# Patient Record
Sex: MALE | Race: WHITE | Employment: OTHER | URBAN - METROPOLITAN AREA
[De-identification: names, ages, dates, MRNs, and addresses within clinical notes are randomized per-mention and may not be internally consistent; named-entity substitution may affect disease eponyms.]

---

## 2017-03-21 ENCOUNTER — ANESTHESIA EVENT (OUTPATIENT)
Dept: SURGERY | Facility: CLINIC | Age: 57
End: 2017-03-21
Payer: COMMERCIAL

## 2017-03-22 ENCOUNTER — HOSPITAL ENCOUNTER (OUTPATIENT)
Facility: CLINIC | Age: 57
Discharge: HOME | End: 2017-03-22
Attending: OPHTHALMOLOGY | Admitting: OPHTHALMOLOGY
Payer: COMMERCIAL

## 2017-03-22 ENCOUNTER — ANESTHESIA (OUTPATIENT)
Dept: SURGERY | Facility: CLINIC | Age: 57
End: 2017-03-22
Payer: COMMERCIAL

## 2017-03-22 ENCOUNTER — SURGERY (OUTPATIENT)
Age: 57
End: 2017-03-22

## 2017-03-22 VITALS
HEIGHT: 66 IN | SYSTOLIC BLOOD PRESSURE: 156 MMHG | DIASTOLIC BLOOD PRESSURE: 98 MMHG | OXYGEN SATURATION: 93 % | BODY MASS INDEX: 34.23 KG/M2 | TEMPERATURE: 98.4 F | RESPIRATION RATE: 15 BRPM | WEIGHT: 213 LBS

## 2017-03-22 DIAGNOSIS — H04.552 NASOLACRIMAL DUCT OBSTRUCTION, ACQUIRED, LEFT: Primary | ICD-10-CM

## 2017-03-22 PROCEDURE — 25000125 ZZHC RX 250: Performed by: ANESTHESIOLOGY

## 2017-03-22 PROCEDURE — 27210794 ZZH OR GENERAL SUPPLY STERILE: Performed by: OPHTHALMOLOGY

## 2017-03-22 PROCEDURE — 36000101 ZZH EYE SURGERY LEVEL 3 1ST 30 MIN: Performed by: OPHTHALMOLOGY

## 2017-03-22 PROCEDURE — 25800025 ZZH RX 258: Performed by: ANESTHESIOLOGY

## 2017-03-22 PROCEDURE — 25000128 H RX IP 250 OP 636: Performed by: NURSE ANESTHETIST, CERTIFIED REGISTERED

## 2017-03-22 PROCEDURE — 37000008 ZZH ANESTHESIA TECHNICAL FEE, 1ST 30 MIN: Performed by: OPHTHALMOLOGY

## 2017-03-22 PROCEDURE — 25000125 ZZHC RX 250: Performed by: NURSE ANESTHETIST, CERTIFIED REGISTERED

## 2017-03-22 PROCEDURE — 71000004 ZZH RECOVERY EYE PHASE 1 LEVEL 1 FIRST HR: Performed by: OPHTHALMOLOGY

## 2017-03-22 PROCEDURE — 40000170 ZZH STATISTIC PRE-PROCEDURE ASSESSMENT II: Performed by: OPHTHALMOLOGY

## 2017-03-22 PROCEDURE — 37000009 ZZH ANESTHESIA TECHNICAL FEE, EACH ADDTL 15 MIN: Performed by: OPHTHALMOLOGY

## 2017-03-22 PROCEDURE — 25000125 ZZHC RX 250: Performed by: OPHTHALMOLOGY

## 2017-03-22 PROCEDURE — 27810169 ZZH OR IMPLANT GENERAL: Performed by: OPHTHALMOLOGY

## 2017-03-22 PROCEDURE — 25000566 ZZH SEVOFLURANE, EA 15 MIN: Performed by: OPHTHALMOLOGY

## 2017-03-22 PROCEDURE — 25000132 ZZH RX MED GY IP 250 OP 250 PS 637: Performed by: OPHTHALMOLOGY

## 2017-03-22 PROCEDURE — 71000028 ZZH EYE RECOVERY PHASE 2 EACH 15 MINS: Performed by: OPHTHALMOLOGY

## 2017-03-22 PROCEDURE — 36000102 ZZH EYE SURGERY LEVEL 3 EA 15 ADDTL MIN: Performed by: OPHTHALMOLOGY

## 2017-03-22 DEVICE — EYE STENT LACRIMAL LRG LIS052: Type: IMPLANTABLE DEVICE | Site: LACRIMAL DUCT | Status: FUNCTIONAL

## 2017-03-22 RX ORDER — FENTANYL CITRATE 50 UG/ML
50-100 INJECTION, SOLUTION INTRAMUSCULAR; INTRAVENOUS
Status: DISCONTINUED | OUTPATIENT
Start: 2017-03-22 | End: 2017-03-22 | Stop reason: HOSPADM

## 2017-03-22 RX ORDER — OXYCODONE AND ACETAMINOPHEN 5; 325 MG/1; MG/1
1-2 TABLET ORAL EVERY 6 HOURS PRN
Qty: 12 TABLET | Refills: 0 | Status: SHIPPED | OUTPATIENT
Start: 2017-03-22 | End: 2020-07-09

## 2017-03-22 RX ORDER — TOBRAMYCIN AND DEXAMETHASONE 3; 1 MG/ML; MG/ML
1 SUSPENSION/ DROPS OPHTHALMIC 4 TIMES DAILY
Qty: 2 ML | Refills: 1 | Status: SHIPPED | OUTPATIENT
Start: 2017-03-22 | End: 2017-04-01

## 2017-03-22 RX ORDER — SODIUM CHLORIDE, SODIUM LACTATE, POTASSIUM CHLORIDE, CALCIUM CHLORIDE 600; 310; 30; 20 MG/100ML; MG/100ML; MG/100ML; MG/100ML
INJECTION, SOLUTION INTRAVENOUS CONTINUOUS
Status: DISCONTINUED | OUTPATIENT
Start: 2017-03-22 | End: 2017-03-22 | Stop reason: HOSPADM

## 2017-03-22 RX ORDER — OXYMETAZOLINE HYDROCHLORIDE 0.05 G/100ML
2 SPRAY NASAL
Status: COMPLETED | OUTPATIENT
Start: 2017-03-22 | End: 2017-03-22

## 2017-03-22 RX ORDER — ONDANSETRON 2 MG/ML
4 INJECTION INTRAMUSCULAR; INTRAVENOUS EVERY 30 MIN PRN
Status: DISCONTINUED | OUTPATIENT
Start: 2017-03-22 | End: 2017-03-22 | Stop reason: HOSPADM

## 2017-03-22 RX ORDER — NEOMYCIN SULFATE, POLYMYXIN B SULFATE, AND DEXAMETHASONE 3.5; 10000; 1 MG/G; [USP'U]/G; MG/G
OINTMENT OPHTHALMIC PRN
Status: DISCONTINUED | OUTPATIENT
Start: 2017-03-22 | End: 2017-03-22 | Stop reason: HOSPADM

## 2017-03-22 RX ORDER — MEPERIDINE HYDROCHLORIDE 25 MG/ML
12.5 INJECTION INTRAMUSCULAR; INTRAVENOUS; SUBCUTANEOUS
Status: DISCONTINUED | OUTPATIENT
Start: 2017-03-22 | End: 2017-03-22 | Stop reason: HOSPADM

## 2017-03-22 RX ORDER — NALOXONE HYDROCHLORIDE 0.4 MG/ML
.1-.4 INJECTION, SOLUTION INTRAMUSCULAR; INTRAVENOUS; SUBCUTANEOUS
Status: DISCONTINUED | OUTPATIENT
Start: 2017-03-22 | End: 2017-03-22 | Stop reason: HOSPADM

## 2017-03-22 RX ORDER — ONDANSETRON 4 MG/1
4 TABLET, ORALLY DISINTEGRATING ORAL EVERY 30 MIN PRN
Status: DISCONTINUED | OUTPATIENT
Start: 2017-03-22 | End: 2017-03-22 | Stop reason: HOSPADM

## 2017-03-22 RX ORDER — FENTANYL CITRATE 50 UG/ML
25-50 INJECTION, SOLUTION INTRAMUSCULAR; INTRAVENOUS
Status: DISCONTINUED | OUTPATIENT
Start: 2017-03-22 | End: 2017-03-22 | Stop reason: HOSPADM

## 2017-03-22 RX ORDER — DEXAMETHASONE SODIUM PHOSPHATE 4 MG/ML
INJECTION, SOLUTION INTRA-ARTICULAR; INTRALESIONAL; INTRAMUSCULAR; INTRAVENOUS; SOFT TISSUE PRN
Status: DISCONTINUED | OUTPATIENT
Start: 2017-03-22 | End: 2017-03-22

## 2017-03-22 RX ORDER — FENTANYL CITRATE 50 UG/ML
INJECTION, SOLUTION INTRAMUSCULAR; INTRAVENOUS PRN
Status: DISCONTINUED | OUTPATIENT
Start: 2017-03-22 | End: 2017-03-22

## 2017-03-22 RX ORDER — PROPOFOL 10 MG/ML
INJECTION, EMULSION INTRAVENOUS CONTINUOUS PRN
Status: DISCONTINUED | OUTPATIENT
Start: 2017-03-22 | End: 2017-03-22

## 2017-03-22 RX ORDER — ONDANSETRON 2 MG/ML
INJECTION INTRAMUSCULAR; INTRAVENOUS PRN
Status: DISCONTINUED | OUTPATIENT
Start: 2017-03-22 | End: 2017-03-22

## 2017-03-22 RX ORDER — CEFAZOLIN SODIUM 1 G/3ML
INJECTION, POWDER, FOR SOLUTION INTRAMUSCULAR; INTRAVENOUS PRN
Status: DISCONTINUED | OUTPATIENT
Start: 2017-03-22 | End: 2017-03-22

## 2017-03-22 RX ORDER — LIDOCAINE HYDROCHLORIDE 20 MG/ML
INJECTION, SOLUTION INFILTRATION; PERINEURAL PRN
Status: DISCONTINUED | OUTPATIENT
Start: 2017-03-22 | End: 2017-03-22

## 2017-03-22 RX ORDER — FENTANYL CITRATE 50 UG/ML
25-50 INJECTION, SOLUTION INTRAMUSCULAR; INTRAVENOUS EVERY 5 MIN PRN
Status: DISCONTINUED | OUTPATIENT
Start: 2017-03-22 | End: 2017-03-22 | Stop reason: HOSPADM

## 2017-03-22 RX ORDER — CEPHALEXIN 500 MG/1
500 CAPSULE ORAL 3 TIMES DAILY
Qty: 30 CAPSULE | Refills: 0 | Status: SHIPPED | OUTPATIENT
Start: 2017-03-22 | End: 2020-07-09

## 2017-03-22 RX ORDER — LIDOCAINE HYDROCHLORIDE AND EPINEPHRINE 10; 10 MG/ML; UG/ML
INJECTION, SOLUTION INFILTRATION; PERINEURAL PRN
Status: DISCONTINUED | OUTPATIENT
Start: 2017-03-22 | End: 2017-03-22 | Stop reason: HOSPADM

## 2017-03-22 RX ORDER — PROPOFOL 10 MG/ML
INJECTION, EMULSION INTRAVENOUS PRN
Status: DISCONTINUED | OUTPATIENT
Start: 2017-03-22 | End: 2017-03-22

## 2017-03-22 RX ADMIN — MIDAZOLAM HYDROCHLORIDE 2 MG: 1 INJECTION, SOLUTION INTRAMUSCULAR; INTRAVENOUS at 11:35

## 2017-03-22 RX ADMIN — LIDOCAINE HYDROCHLORIDE,EPINEPHRINE BITARTRATE 4 ML: 10; .01 INJECTION, SOLUTION INFILTRATION; PERINEURAL at 12:00

## 2017-03-22 RX ADMIN — PROPOFOL 200 MCG/KG/MIN: 10 INJECTION, EMULSION INTRAVENOUS at 11:43

## 2017-03-22 RX ADMIN — LIDOCAINE HYDROCHLORIDE 1 ML: 10 INJECTION, SOLUTION EPIDURAL; INFILTRATION; INTRACAUDAL; PERINEURAL at 09:09

## 2017-03-22 RX ADMIN — ONDANSETRON 4 MG: 2 INJECTION INTRAMUSCULAR; INTRAVENOUS at 12:55

## 2017-03-22 RX ADMIN — PROPOFOL 50 MG: 10 INJECTION, EMULSION INTRAVENOUS at 11:47

## 2017-03-22 RX ADMIN — OXYMETAZOLINE HYDROCHLORIDE 2 SPRAY: 5 SPRAY NASAL at 11:17

## 2017-03-22 RX ADMIN — LIDOCAINE HYDROCHLORIDE 100 MG: 20 INJECTION, SOLUTION INFILTRATION; PERINEURAL at 11:40

## 2017-03-22 RX ADMIN — FENTANYL CITRATE 50 MCG: 50 INJECTION, SOLUTION INTRAMUSCULAR; INTRAVENOUS at 11:39

## 2017-03-22 RX ADMIN — FENTANYL CITRATE 50 MCG: 50 INJECTION, SOLUTION INTRAMUSCULAR; INTRAVENOUS at 11:51

## 2017-03-22 RX ADMIN — ONDANSETRON 4 MG: 2 INJECTION INTRAMUSCULAR; INTRAVENOUS at 12:24

## 2017-03-22 RX ADMIN — OXYMETAZOLINE HYDROCHLORIDE 2 SPRAY: 5 SPRAY NASAL at 11:08

## 2017-03-22 RX ADMIN — DEXMEDETOMIDINE 0.5 MCG/KG/HR: 100 INJECTION, SOLUTION, CONCENTRATE INTRAVENOUS at 11:43

## 2017-03-22 RX ADMIN — CEFAZOLIN 2 G: 1 INJECTION, POWDER, FOR SOLUTION INTRAMUSCULAR; INTRAVENOUS at 11:57

## 2017-03-22 RX ADMIN — SODIUM CHLORIDE, POTASSIUM CHLORIDE, SODIUM LACTATE AND CALCIUM CHLORIDE: 600; 310; 30; 20 INJECTION, SOLUTION INTRAVENOUS at 11:16

## 2017-03-22 RX ADMIN — PROPOFOL 200 MG: 10 INJECTION, EMULSION INTRAVENOUS at 11:40

## 2017-03-22 RX ADMIN — PROPOFOL 50 MG: 10 INJECTION, EMULSION INTRAVENOUS at 12:12

## 2017-03-22 RX ADMIN — DEXAMETHASONE SODIUM PHOSPHATE 8 MG: 4 INJECTION, SOLUTION INTRAMUSCULAR; INTRAVENOUS at 11:47

## 2017-03-22 RX ADMIN — NEOMYCIN SULFATE, POLYMYXIN B SULFATE, AND DEXAMETHASONE 1 G: 3.5; 10000; 1 OINTMENT OPHTHALMIC at 12:48

## 2017-03-22 ASSESSMENT — COPD QUESTIONNAIRES: COPD: 0

## 2017-03-22 ASSESSMENT — LIFESTYLE VARIABLES: TOBACCO_USE: 0

## 2017-03-22 ASSESSMENT — ENCOUNTER SYMPTOMS
ORTHOPNEA: 0
SEIZURES: 0
DYSRHYTHMIAS: 0

## 2017-03-22 NOTE — DISCHARGE INSTRUCTIONS
[facility] Discharge  Instructions  Anesthesia ([facility])   Adult Discharge Instructions (General)    For 24 hours after surgery    1. Get plenty of rest.  Make arrangements to have a responsible adult stay with you for at least 24 hours.  2. Do not drive or use heavy equipment for 24 hours.    3. Do not drink alcohol for 24 hours.  4. Do not sign legal documents or make important decisions for 24 hours.  5. Avoid strenuous or risky activities. You may feel lightheaded.  If so, sit for a few minutes before standing.  Have someone help you get up.   6. General anesthesia patients should drink only clear liquids (apple juice, ginger ale, broth or 7-Up). Be sure to drink enough fluids.  Move to a regular diet as you feel able.  7. Any questions of medical nature, call your physician.    Dacryocystorhinostomy Discharge Instructions   [place] [facility]  LIZETH JONES MD  [facility]  [address]  [place], [place] [address] [phone]  Things to avoid:    You should avoid blowing the nose forcefully or heavy sneezing while any tubing is in place to keep the tubes from coming loose or displacing.  The inside of the nose should not be disturbed if at all possible.  If you have any drying or discomfort following your DRC on the inside of the nose, it is okay to apply a small amount of Vaseline gently to the area.  Additionally, saline nasal spray may be helpful. If you feel any stuffiness or congestion while any tubing is in place, we recommend nasal strips that can be bought at any drugstore and worn at night.    You will see a tiny end of the silicone tubing extruding from the corner of your operated eye(s). Be careful not to disturb this area. If the silicone tubing does dislodge and extend into the eye, tape the tube using medical adhesive, to the side of the nose.  Call our office to set up an appointment to have the  tube repositioned or removed.  The rubber tube inside your nose should fall out on its own in about 4-6 weeks. If it does not, call the office and the doctor can remove it in the office.  If the tube loosens or falls out sooner, it is okay because this tube was in place only to add extra bulk while the natural passage is forming and won t severely affect the overall outcome of the surgery.  Activity:  Please avoid heavy lifting or strenuous exercise for seven days after surgery. You may resume regular activities as tolerated. Air travel should also be avoided during this period of time. You may carefully shower on the second day after surgery.  At night, sleep with your head elevated on 2-3 pillows.  Using ice packs will help reduce bruising and swelling. Continue this until the swelling subsides.    Medication:  If the doctor has given you some medications to take after surgery, please take these according to the instructions on the bottle. Pain medications may make you drowsy so do not drive, operate heavy machinery, or use alcohol while taking pain medications.  Bleeding:  After a DCR, it is normal to have some bloody discharge from the nose that may empty into the back of the throat. This will subside over the first one to two days after surgery. Please contact the doctor if there is excessive bleeding which does not respond to simple pressure on the nose.  Questions:  Please feel free to contact the office should any questions come up which are not answered above. The phone number is [phone].    Reasons to contact your surgeon:    1. Signs of possible infection: Check your incision daily for redness, swelling, warmth, red streaks or foul drainage.   2. Elevated temperature.  3. Pain not controlled with pain medication and/or rest.   4. Uncontrolled nausea or vomiting.  5. Any questions or concerns.

## 2017-03-22 NOTE — IP AVS SNAPSHOT
[facility]    [address]    [place] [address]    Phone:  [phone]    Fax:  [phone]                                       After Visit Summary   3/22/2017    Javier Hopson    MRN: [id number]           After Visit Summary Signature Page     I have received my discharge instructions, and my questions have been answered. I have discussed any challenges I see with this plan with the nurse or doctor.    ..........................................................................................................................................  Patient/Patient Representative Signature      ..........................................................................................................................................  Patient Representative Print Name and Relationship to Patient    ..................................................               ................................................  Date                                            Time    ..........................................................................................................................................  Reviewed by Signature/Title    ...................................................              ..............................................  Date                                                            Time

## 2017-03-22 NOTE — PROGRESS NOTES
LMA removed at bedside with MADHAVI Wagoner. Pt suctioned, appears well oxygenated on 6L Simple Facemask. RN to wean O2.

## 2017-03-22 NOTE — IP AVS SNAPSHOT
MRN:[id number]                      After Visit Summary   3/22/2017    Javier Hopson    MRN: [id number]           Thank you!     Thank you for choosing [facility] for your care. Our goal is always to provide you with excellent care. Hearing back from our patients is one way we can continue to improve our services. Please take a few minutes to complete the written survey that you may receive in the mail after you visit with us. Thank you!        Patient Information     Date Of Birth          1960        About your hospital stay     You were admitted on:  March 22, 2017 You last received care in the:  [facility]    You were discharged on:  March 22, 2017       Who to Call     For medical emergencies, please call 911.  For non-urgent questions about your medical care, please call your primary care provider or clinic, [phone]  For questions related to your surgery, please call your surgery clinic        Attending Provider     Provider Specialty    Maurisio Rueda MD Ophthalmology       Primary Care Provider Office Phone # Fax #    [facility] [phone]       [address] [place] [address] [address]  [facility] [id number]        After Care Instructions     Eye drops as prescribed by physician.  Start drops today unless told otherwise by the physician           Notify Physician if you have severe headache or nausea           Remove patch per physician instruction           Return to clinic as instructed by physician           Wear eye shield or patch as directed                 Further instructions from your care team       [facility] Discharge  Instructions  Anesthesia ([facility])   Adult Discharge Instructions (General)    For 24 hours after surgery    1. Get plenty of rest.  Make arrangements to have a responsible adult stay with you for at least 24 hours.  2. Do not drive or use heavy  equipment for 24 hours.    3. Do not drink alcohol for 24 hours.  4. Do not sign legal documents or make important decisions for 24 hours.  5. Avoid strenuous or risky activities. You may feel lightheaded.  If so, sit for a few minutes before standing.  Have someone help you get up.   6. General anesthesia patients should drink only clear liquids (apple juice, ginger ale, broth or 7-Up). Be sure to drink enough fluids.  Move to a regular diet as you feel able.  7. Any questions of medical nature, call your physician.    Dacryocystorhinostomy Discharge Instructions   [place] [facility]  LIZETH JONES MD  [facility]  [address]  [place], [place] [address] [phone]  Things to avoid:    You should avoid blowing the nose forcefully or heavy sneezing while any tubing is in place to keep the tubes from coming loose or displacing.  The inside of the nose should not be disturbed if at all possible.  If you have any drying or discomfort following your DRC on the inside of the nose, it is okay to apply a small amount of Vaseline gently to the area.  Additionally, saline nasal spray may be helpful. If you feel any stuffiness or congestion while any tubing is in place, we recommend nasal strips that can be bought at any drugstore and worn at night.    You will see a tiny end of the silicone tubing extruding from the corner of your operated eye(s). Be careful not to disturb this area. If the silicone tubing does dislodge and extend into the eye, tape the tube using medical adhesive, to the side of the nose.  Call our office to set up an appointment to have the tube repositioned or removed.  The rubber tube inside your nose should fall out on its own in about 4-6 weeks. If it does not, call the office and the doctor can remove it in the office.  If the tube loosens or falls out sooner, it is okay because this tube was in place only to add extra bulk while the  "natural passage is forming and won t severely affect the overall outcome of the surgery.  Activity:  Please avoid heavy lifting or strenuous exercise for seven days after surgery. You may resume regular activities as tolerated. Air travel should also be avoided during this period of time. You may carefully shower on the second day after surgery.  At night, sleep with your head elevated on 2-3 pillows.  Using ice packs will help reduce bruising and swelling. Continue this until the swelling subsides.    Medication:  If the doctor has given you some medications to take after surgery, please take these according to the instructions on the bottle. Pain medications may make you drowsy so do not drive, operate heavy machinery, or use alcohol while taking pain medications.  Bleeding:  After a DCR, it is normal to have some bloody discharge from the nose that may empty into the back of the throat. This will subside over the first one to two days after surgery. Please contact the doctor if there is excessive bleeding which does not respond to simple pressure on the nose.  Questions:  Please feel free to contact the office should any questions come up which are not answered above. The phone number is [phone].    Reasons to contact your surgeon:    1. Signs of possible infection: Check your incision daily for redness, swelling, warmth, red streaks or foul drainage.   2. Elevated temperature.  3. Pain not controlled with pain medication and/or rest.   4. Uncontrolled nausea or vomiting.  5. Any questions or concerns.      Pending Results     No orders found from 3/20/2017 to 3/23/2017.            Admission Information     Date & Time Provider Department Dept. Phone    3/22/2017 Maurisio Rueda MD [facility] [place] [facility] [phone]      Your Vitals Were     Blood Pressure Temperature Respirations Height Weight Pulse Oximetry    139/88 98.4  F (36.9  C) (Temporal) 15 1.664 m (5' 5.5\") 96.6 kg (213 lb) 92% " "   BMI (Body Mass Index)                   34.91 kg/m2           [facility] Information     [facility] lets you send messages to your doctor, view your test results, renew your prescriptions, schedule appointments and more. To sign up, go to [email or web address] . Click on \"Log in\" on the left side of the screen, which will take you to the Welcome page. Then click on \"Sign up Now\" on the right side of the page.     You will be asked to enter the access code listed below, as well as some personal information. Please follow the directions to create your username and password.     Your access code is: [email or web address]  Expires: 2017  1:27 PM     Your access code will  in 90 days. If you need help or a new code, please call your [facility] clinic or [phone].        Care EveryWhere ID     This is your Care EveryWhere ID. This could be used by other organizations to access your [facility] medical records  [id number]           Review of your medicines      START taking        Dose / Directions    cephALEXin 500 MG capsule   Commonly known as:  KEFLEX   Used for:  Nasolacrimal duct obstruction, acquired, left        Dose:  500 mg   Take 1 capsule (500 mg) by mouth 3 times daily   Quantity:  30 capsule   Refills:  0       oxyCODONE-acetaminophen 5-325 MG per tablet   Commonly known as:  PERCOCET   Used for:  Nasolacrimal duct obstruction, acquired, left        Dose:  1-2 tablet   Take 1-2 tablets by mouth every 6 hours as needed for pain maximum 6 tablet(s) per day   Quantity:  12 tablet   Refills:  0       tobramycin-dexamethasone 0.3-0.1 % ophthalmic susp   Commonly known as:  TOBRADEX   Used for:  Nasolacrimal duct obstruction, acquired, left        Dose:  1 drop   Place 1 drop Into the left eye 4 times daily for 10 days   Quantity:  2 mL   Refills:  1         CONTINUE these medicines which have NOT CHANGED        Dose / Directions    erythromycin ophthalmic ointment   Commonly known as:  ROMYCIN   Used for:  " Postoperative eye state        Apply small amount to incision sites three times daily and into operative eye(s) at night.   Quantity:  3.5 g   Refills:  0       HYDROcodone-acetaminophen 5-325 MG per tablet   Commonly known as:  NORCO   Used for:  Postoperative eye state        Dose:  1 tablet   Take 1 tablet by mouth every 6 hours as needed for pain Maximum of 4000 mg of acetaminophen in 24 hours.   Quantity:  15 tablet   Refills:  0            Where to get your medicines      These medications were sent to [facility] - [address]  [address], [place] [address]     Phone:  [phone]     cephALEXin 500 MG capsule    tobramycin-dexamethasone 0.3-0.1 % ophthalmic susp         Some of these will need a paper prescription and others can be bought over the counter. Ask your nurse if you have questions.     Bring a paper prescription for each of these medications     oxyCODONE-acetaminophen 5-325 MG per tablet                Protect others around you: Learn how to safely use, store and throw away your medicines at www.disposemymeds.org.             Medication List: This is a list of all your medications and when to take them. Check marks below indicate your daily home schedule. Keep this list as a reference.      Medications           Morning Afternoon Evening Bedtime As Needed    cephALEXin 500 MG capsule   Commonly known as:  KEFLEX   Take 1 capsule (500 mg) by mouth 3 times daily                                erythromycin ophthalmic ointment   Commonly known as:  ROMYCIN   Apply small amount to incision sites three times daily and into operative eye(s) at night.                                HYDROcodone-acetaminophen 5-325 MG per tablet   Commonly known as:  NORCO   Take 1 tablet by mouth every 6 hours as needed for pain Maximum of 4000 mg of acetaminophen in 24 hours.                                oxyCODONE-acetaminophen 5-325 MG per tablet   Commonly known as:   PERCOCET   Take 1-2 tablets by mouth every 6 hours as needed for pain maximum 6 tablet(s) per day                                tobramycin-dexamethasone 0.3-0.1 % ophthalmic susp   Commonly known as:  TOBRADEX   Place 1 drop Into the left eye 4 times daily for 10 days

## 2017-03-22 NOTE — ANESTHESIA PREPROCEDURE EVALUATION
Procedure: Procedure(s):  DACRYOCYSTORHINOSTOMY  Preop diagnosis: LEFT EYE CHRONIC DACRYOCYSTITIS    Allergies   Allergen Reactions     Phenobarbital      Past Medical History:   Diagnosis Date     Achilles bursitis or tendinitis      Closed fracture of middle or proximal phalanx or phalanges of hand      Lipoma of other skin and subcutaneous tissue      Nevus, non-neoplastic      Pure hypercholesterolemia      Viral warts, unspecified      Past Surgical History:   Procedure Laterality Date     C POST CAPSULAR RELEASE,KNEE       HC REVISE MEDIAN N/CARPAL TUNNEL SURG       REPAIR ECTROPION Bilateral 12/14/2015    Procedure: REPAIR ECTROPION;  Surgeon: Galilea Skinner MD;  Location: [facility] OR     Prior to Admission medications    Medication Sig Start Date End Date Taking? Authorizing Provider   erythromycin (ROMYCIN) ophthalmic ointment Apply small amount to incision sites three times daily and into operative eye(s) at night. 12/14/15   Galilea Skinner MD   HYDROcodone-acetaminophen (NORCO) 5-325 MG per tablet Take 1 tablet by mouth every 6 hours as needed for pain Maximum of 4000 mg of acetaminophen in 24 hours. 12/14/15   Galilea Skinner MD     Current Facility-Administered Medications Ordered in Epic   Medication Dose Route Frequency Last Rate Last Dose     lidocaine 1 % 1 mL  1 mL Other Q1H PRN   1 mL at 03/22/17 0909     lactated ringers infusion   Intravenous Continuous         oxymetazoline (AFRIN) 0.05 % spray 2 spray  2 spray Nasal q5 Min Prior to Surgery         No current [facility]-ordered outpatient prescriptions on file.     Wt Readings from Last 1 Encounters:   03/21/17 96.6 kg (213 lb)     Temp Readings from Last 1 Encounters:   03/22/17 37.1  C (98.8  F) (Temporal)     BP Readings from Last 6 Encounters:   03/22/17 (!) 148/97   12/14/15 141/77     Pulse Readings from Last 4 Encounters:   No data found for Pulse     Resp Readings from Last 1 Encounters:   03/22/17 16     SpO2 Readings from Last 1  Encounters:   03/22/17 93%       Anesthesia Evaluation     . Pt has had prior anesthetic. Type: General    No history of anesthetic complications          ROS/MED HX    ENT/Pulmonary:     (+), recent URI unresolved mild, no fever/chills, no sputum production, non-toxic: . .   (-) tobacco use, asthma, COPD and sleep apnea   Neurologic:      (-) seizures and CVA   Cardiovascular:     (+) Dyslipidemia, ----. : . . . :. .      (-) angina, hypertension (borderline HTN), CAD, orthopnea/PND, syncope, arrhythmias, irregular heartbeat/palpitations, valvular problems/murmurs and angina   METS/Exercise Tolerance:  >4 METS   Hematologic:         Musculoskeletal:         GI/Hepatic:        (-) GERD and liver disease   Renal/Genitourinary:      (-) renal disease   Endo:      (-) Type II DM and thyroid disease   Psychiatric:         Infectious Disease:         Malignancy:         Other:                     Physical Exam  Normal systems: dental    Airway   Mallampati: II  TM distance: >3 FB  Neck ROM: full    Dental     Cardiovascular   Rhythm and rate: regular and normal  (-) no murmur    Pulmonary    breath sounds clear to auscultation(-) no wheezes                    Anesthesia Plan      History & Physical Review  History and physical reviewed and following examination; no interval change.    ASA Status:  1 .    NPO Status:  > 8 hours    Plan for General and LMA with Propofol and Intravenous induction. Maintenance will be Balanced.    PONV prophylaxis:  Other (See comment) and Ondansetron (or other 5HT-3) (propofol infusion)  Decadron 8 mg   Zofran 8 mg (divided over last half hour of case)  Propofol infusion  Precedex infusion  Pt aware of possible increased respiratory risk and wishes to proceed.       Postoperative Care  Postoperative pain management:  Multi-modal analgesia.      Consents  Anesthetic plan, risks, benefits and alternatives discussed with:  Patient..

## 2017-03-22 NOTE — ANESTHESIA POSTPROCEDURE EVALUATION
Patient: Javier Hopson    Procedure(s):  LEFT DACRYOCYSTORHINOSTOMY - Wound Class: I-Clean    Diagnosis:LEFT EYE CHRONIC DACRYOCYSTITIS  Diagnosis Additional Information: No value filed.    Anesthesia Type:  General, LMA    Note:  Anesthesia Post Evaluation    Patient location during evaluation: PACU  Patient participation: Able to fully participate in evaluation  Level of consciousness: awake  Pain management: adequate  Airway patency: patent  Cardiovascular status: acceptable  Respiratory status: acceptable  Hydration status: acceptable  PONV: none     Anesthetic complications: None          Last vitals:  Vitals:    03/22/17 1345 03/22/17 1400 03/22/17 1413   BP: 142/82 139/88 (!) 156/98   Resp: 13 15 15   Temp: 36.9  C (98.4  F)     SpO2: 94% 92% 93%         Electronically Signed By: Stephon Wagoner MD  March 22, 2017  4:30 PM

## 2017-03-22 NOTE — BRIEF OP NOTE
[facility] Brief Operative Note    Pre-operative diagnosis: LEFT EYE CHRONIC DACRYOCYSTITIS   Post-operative diagnosis RIZWAN   Procedure: Procedure(s):  LEFT DACRYOCYSTORHINOSTOMY - Wound Class: I-Clean   Surgeon(s): Surgeon(s) and Role:     * Maurisio Rueda MD - Primary   Estimated blood loss: 10ml    Specimens: * No specimens in log *   Findings: Consistent with preop findings

## 2017-03-22 NOTE — ANESTHESIA CARE TRANSFER NOTE
Patient: Javier Hopson    Procedure(s):  LEFT DACRYOCYSTORHINOSTOMY - Wound Class: I-Clean    Diagnosis: LEFT EYE CHRONIC DACRYOCYSTITIS  Diagnosis Additional Information: No value filed.    Anesthesia Type:   General, LMA     Note:  Airway :LMA  Patient transferred to:PACU  Comments: LMA left in with oxygen blowing down LMA.  Report to RN.      Vitals: (Last set prior to Anesthesia Care Transfer)    CRNA VITALS  3/22/2017 1231 - 3/22/2017 1308      3/22/2017             Resp Rate (observed): 14                Electronically Signed By: Maddie Pacheco  March 22, 2017  1:08 PM

## 2017-03-25 NOTE — OP NOTE
DATE OF PROCEDURE:  03/22/2017      PREOPERATIVE DIAGNOSIS:  Nasolacrimal duct obstruction left side.       POSTOPERATIVE DIAGNOSIS:  Nasolacrimal duct obstruction left side.       PROCEDURE:  External dacryocystorhinostomy left side.       SURGEON:  Maurisio Rueda MD      ANESTHESIA:  General.      ESTIMATED BLOOD LOSS:  10 mL.      SPECIMENS:  None.      FINDINGS:  Consistent with preoperative assessment.      INDICATIONS:  Javier Hopson is a very pleasant 56-year-old gentleman who presented in 01/2017 to our offices in [place] with a 1-1/2 year history of persistent tearing and discharge from the left eye.  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  It was felt this to be due to a nasolacrimal duct obstruction.      The risks, benefits and alternatives of external dacryocystorhinostomy/DCR surgery were reviewed by myself with the patient at length during the preoperative consultation in the office.  The risks discussed include but were not limited to pain, bleeding, swelling, bruising, persistent and/or recurrent tearing as well as the possibility of postoperative hemorrhage and need for further care and/or surgery.  No guarantee of outcome were made.  All questions have been answered.  The patient had been provided with literature on this condition from the [employer].  The patient had verbalized understanding and elected to proceed with this surgery.  Of note, this is under general anesthesia.      DESCRIPTION OF PROCEDURE:  The patient was identified in the preoperative area at the [facility].  The consent was verified and signed.  A mia was placed over the patient's left eyebrow with a skin marker denoting a left-sided surgery.  The patient was then taken back to the main operating room where after induction of general endotracheal anesthesia a skin marker was used to mia the planned surgical incision midway between  the apex of the bridge of the nose and medial canthus extending approximately 15 mm towards the lateral nasal ala on the patient's left side.  Two cottonoids soaked in Afrin/oxymetazoline were then placed with a bayonet forceps into the left naris up into the region of the middle turbinate.  Local anesthetic was infiltrated around the area of the skin markings at the medial canthus of the patient's left side, as well as the medial aspect of the lower and upper eyelids.  The patient was then prepped and draped in the usual standard fashion.  The surgery commenced with an incision along the previously created skin markings with the [employer] 15 blade.  Blunt dissection was then performed through the underlying orbicularis muscle down to the periosteum.  A Ashland type elevator was then used to sharply incise the underlying periosteum.  The periosteum was then elevated anteriorly as well as posteriorly toward the anterior lacrimal crest.  Four 4-0 silk sutures were then placed at the corners of the incision and tacked to the surgical drape with a small hemostat type clamp.  The periosteum and lacrimal sac was then mobilized laterally from the lacrimal fossa with the Ashland elevator.  A small muscle hook was then used to penetrate the thin lacrimal fossa bone.  A small Pretty Sella punch type rongeur was then used to create an initial small osteotomy through the lacrimal fossa.  A Rutherford type rongeur was then used to enlarge the osteotomy to approximately 15 x 15 mm, taking care to not traumatize the underlying nasal mucosa.  A Belz type rongeur was also used at the end to remove some remaining bone at the inferior aspect of the osteotomy.  The lacrimal system was then probed with 1-0 Tovar probe.  The lacrimal sac was then sharply opened with a crescent blade along its vertical plane.  A large amount of chronic inflammatory material presented which was removed with suction.  Of note, suction was used throughout the  surgery to maintain dryness of the surgical field.  Superior and inferior nasolacrimal sac flaps were then created using the crescent blade and a Adeline scissors.  Superior and inferior flaps were also then created on the nasal mucosa, also using the crescent blade and Adeline scissors.  The inferior flaps were then excised sharply with the Adeline scissors.  The cottonoids previously placed in the nose were now removed.  The lacrimal system was then intubated with a nasolacrimal silicone stent through the superior and inferior canaliculi.  The stent was then retrieved through the nose for both arms of the same.  A single 4-0 silk suture was then tied around the 2 stents at the level of the osteotomy.  The anterior flaps were then sutured together with 2 interrupted 4-0 chromic sutures.  The silicone stent was then tied to itself 1 cm superior to the opening of the nasal ala, cut and allowed to retract into the nose.  The incision was then repaired with interrupted 5-0 Vicryl sutures, approximating the orbicularis muscle.  The sutures were placed in a buried fashion.  The skin was closed with running 5-0 fast-absorbing gut suture.  The incision was dressed with antibiotic ointment.  The patient did receive 2 grams of Ancef IV perioperatively as well as 10 mg of Decadron.  The patient was then recovered uneventfully by the anesthesiology and anesthesia staff.  The patient is to follow up in 1 week for followup in the office.  Postoperative instructions were reviewed upon discharge with the patient.  The patient was also contacted on the telephone in follow up 1 day after surgery and found to be doing well.  There were no apparent operative complications.         LIZETH JONES MD             D: 2017 18:48   T: 2017 16:27   MT: [id number]      Name:     JOSE ELIAS JOHN   MRN:      [id number]        Account:        [id number]   :      1960           Procedure Date: 2017       Document: [id number]

## 2020-07-09 ENCOUNTER — VIRTUAL VISIT (OUTPATIENT)
Dept: DERMATOLOGY | Facility: CLINIC | Age: 60
End: 2020-07-09
Payer: COMMERCIAL

## 2020-07-09 DIAGNOSIS — R21 RASH: Primary | ICD-10-CM

## 2020-07-09 PROCEDURE — 99202 OFFICE O/P NEW SF 15 MIN: CPT | Mod: 95 | Performed by: DERMATOLOGY

## 2020-07-09 RX ORDER — HYDROCHLOROTHIAZIDE 25 MG/1
TABLET ORAL
COMMUNITY
Start: 2018-12-19

## 2020-07-09 RX ORDER — PRAVASTATIN SODIUM 40 MG
40 TABLET ORAL
COMMUNITY
Start: 2018-10-09

## 2020-07-09 NOTE — LETTER
7/9/2020         RE: Javier Hopson  [address]  [place] [facility] [address]        Dear Colleague,    Thank you for referring your patient, Javier Hopson, to the [facility]. Please see a copy of my visit note below.    [facility]  [facility]      Impression and Recommendations (Patient Counseled on the Following):  1.Rash, lower legs and abdomen, possibly dermatitis but also has yellow nails on photos, consider tinea. Work boots and  Emu oil and witch hazel may be contributing  -hold all topicals at home  -hold triamcinolone  -come to clinic for KOH  -sarana with menthol until follow up  -vanicream until follow up      Follow-up:   Follow-up with dermatology in approximately 1-2 weeks for koh scraping. Earlier for new or changing lesions or rash.      Staff only:    Vani Spears MD    Department of Dermatology  [facility]: Phone: [phone], Fax:[phone]  [facility]: Phone: [phone], Fax: [phone]        _____________________________________________________________________________    Dermatology Problem List:  New    Encounter Date: Jul 9, 2020    CC:   Chief Complaint   Patient presents with     Derm Problem     Patient reported: It is a heat rash that starts early spring the size of a nickel and then it spreads slowly. So far it s just on my ankle, calves and around my belly button.       History of Present Illness:  I have reviewed the teledermatology information and the nursing intake corresponding to this issue. Javier Hopson is a 59 year old male who presents via teledermatology for  Rash, occurs in the spring and summer. Heat rash like. Ankles most likely. Staarts size of nickel and spreads.       Uses work boot. Emu oil and vanicrema    No hx of skin cancer    ROS: Patient is generally feeling well today       Physical Examination:  General: Well-appearing appropriately-developed individual.  Skin: Focused examination within the teledermatology photograph(s) including lower legs was performed.   - red scaly macules, left abdomen and scaly hyperpigmented and erythematous patches, bilaterla medial ankles and calves    Labs:  NA    Past Medical History:   There is no problem list on file for this patient.    Past Medical History:   Diagnosis Date     Achilles bursitis or tendinitis      Closed fracture of middle or proximal phalanx or phalanges of hand      Lipoma of other skin and subcutaneous tissue      Nevus, non-neoplastic      Pure hypercholesterolemia      Viral warts, unspecified      Past Surgical History:   Procedure Laterality Date     C POST CAPSULAR RELEASE,KNEE       DACRYOCYSTORHINOSTOMY Left 3/22/2017    Procedure: DACRYOCYSTORHINOSTOMY;  Surgeon: Maurisio Rueda MD;  Location: [facility] REVISE MEDIAN N/CARPAL TUNNEL SURG       REPAIR ECTROPION Bilateral 12/14/2015    Procedure: REPAIR ECTROPION;  Surgeon: Galilea Skinner MD;  Location:  OR       Social History:  Patient reports that he has never smoked. He does not have any smokeless tobacco history on file. He reports that he does not drink alcohol or use drugs.    Family History:  Family History   Problem Relation Age of Onset     Eye Disorder Mother         glaucoma     Eye Disorder Father         glaucoma       Medications:  Current Outpatient Medications   Medication     cephALEXin (KEFLEX) 500 MG capsule     erythromycin (ROMYCIN) ophthalmic ointment     HYDROcodone-acetaminophen (NORCO) 5-325 MG per tablet     oxyCODONE-acetaminophen (PERCOCET) 5-325 MG per tablet     No current facility-administered medications for this visit.           Allergies   Allergen Reactions     Phenobarbital          _____________________________________________________________________________    Teledermatology information:  - Location of patient:  "[place]  - Patient presented as: self referral  - Location of teledermatologist:  ([facility] )  - Reason teledermatology is appropriate:  of National Emergency Regarding Coronavirus disease (COVID 19) Outbreak  - Image quality and interpretability: acceptable  - Physician has received verbal consent for a Video/Photos Visit from the patient? Yes  - In-person dermatology visit recommendation: yes - for physician visit  - Date of images: 7/8/2020  - Service start time:8:40am  - Service end time:8:57am  - Date of report: 7/9/2020         Teledermatology Nurse Call for NEW Patients (not seen in last 3 years):     The patient was contacted by phone and we reviewed, \"Due to the coronavirus pandemic, we are calling to reschedule your upcoming visit and offer you a teledermatology visit. This would be assessed by an MD or PA-C;  Importantly, \"a teledermatology visit may not be as thorough as an in-person visit, and the quality of the photograph and/or video sent may not be of the same quality as that taken by the dermatology clinic.\" After discussing the risks, benefits, and alternatives, the patient would like to proceed with teledermatology because of National Emergency Regarding Coronavirus disease (COVID 19) Outbreak.\"    This video visit will be conducted via a call between you and your physician/provider via [employer]. We have found that certain health care needs can be provided without the need for an in-person physical exam.  This service lets us provide the care you need with a video conversation.  If a prescription is necessary we can send it directly to your pharmacy.  If lab work is needed we can place an order for that and you can then stop by our lab to have the test done at a later time.If during the course of the call the physician/provider feels a video visit is not appropriate, you will not be charged for this service.Visits are billed at different rates depending on your insurance " coverage. Please reach out to your insurance provider with any questions.    Patient would like the video invitation sent by:  [employer] waiting room     The patient will also send photographs via [email or web address] for review.       The patient verified the location of the photo/video to be [place]. The physician must be notified by nurse if the patient is not in the state of [place] during the encounter     For photos, patient told nursing these are already uploaded     The patient was instructed to take photos of all areas of concern and all areas of any rash.       The patient denied skin pain, fever, mucosal symptoms(lesions, blisters, sores in the mouth, nose, eyes, or genitals)  IF PATIENT ENDORSES ANY OF THESE STOP AND PAGE ON CALL ATTENDING    Additional notes:  Patient summary of issue: Heat rash   Location of problem on body:   Chief Complaint   Patient presents with     Derm Problem     Patient reported: It is a heat rash that starts early spring the size of a nickel and then it spreads slowly. So far it s just on my ankle, calves and around my belly button.     How long has area/symptoms been present: Ongoing during spring and summer time   What makes it better?: Taking hot showers to soothe the itch, instead of itching the areas   What makes it worse?:Humidity   Other symptoms include the following: Itches, but he does not itch it.   Which medications have been tried, for how long, and did they make it better or worse (ex. Triamcinolone, used twice daily for 2 weeks, not improved): He has tried Triamcinolone cream in the past and did not work. He was taking an oral Rx that cleared up areas, but does not recall name of Rx.   The patient has not seen a dermatologist in the past.   The patient hasno past medical history of skin cancer  ROS: The patient is generally feeling well.     Photo instructions reviewed with patients as below:  -ALL patient needs to send photos unless they have a phone only visit approved by the  clinic:  o To send photos to your doctor, respond to the message in English TV as many times as needed to upload your photos. Each message allows for 3 photos.  o For spots or lesions of concern, please take at least 2 photos of each site you are worried about (at different angles if needed, and at least one close up and one farther away so we can tell where it is on the body. Be sure all photographs are in focus)  o For rashes, take photos of the entire body because it is important for us to see which areas are involved and which areas are not involved.  At a minimum, please include photos of the arms, legs, front of trunk, back of trunk, face, and a few close-ups of the rash.  Leave undergarments in place unless the rash involves the skin in these areas  o For acne, please take photos of the face, upper chest and neck, and upper chest and back  o For hair loss, please send views of the top of the head, sides of the head, back of the head and a picture of your face with your hair pulled back. Also, a photos of both hands with nails.    -For ADULT NEW patients video visits are needed, to receive an invitation and connect with your provider, the [employer] video visit technology must be accessible from your smartphone or personal device. Please click the link below for setup instructions: [email or web address]      Nursing tasks completed  -Pharmacy preference was updated.  -The nurse has dropped in the AVS information *(For adults the phrase is umdermhteleavs and for pediatrics it is their own) for the physician to route in the AVS.                                                                                                                                                                                                                         -The patient was told to contact the clinic if they have not received correspondence within 72 hours.         Again, thank you for allowing me to participate in the care of your  patient.        Sincerely,        Vani Spears MD

## 2020-07-09 NOTE — PROGRESS NOTES
[facility]  Mychart Connected      Impression and Recommendations (Patient Counseled on the Following):  1.Rash, lower legs and abdomen, possibly dermatitis but also has yellow nails on photos, consider tinea. Work boots and  Emu oil and witch hazel may be contributing  -hold all topicals at home  -hold triamcinolone  -come to clinic for KOH  -sarana with menthol until follow up  -vanicream until follow up      Follow-up:   Follow-up with dermatology in approximately 1-2 weeks for koh scraping. Earlier for new or changing lesions or rash.      Staff only:    Vani Spears MD    Department of Dermatology  [facility]: Phone: [phone], Fax:[phone]  [facility]: Phone: [phone], Fax: [phone]        _____________________________________________________________________________    Dermatology Problem List:  New    Encounter Date: Jul 9, 2020    CC:   Chief Complaint   Patient presents with     Derm Problem     Patient reported: It is a heat rash that starts early spring the size of a nickel and then it spreads slowly. So far it s just on my ankle, calves and around my belly button.       History of Present Illness:  I have reviewed the teledermatology information and the nursing intake corresponding to this issue. Javier Hopson is a 59 year old male who presents via teledermatology for  Rash, occurs in the spring and summer. Heat rash like. Ankles most likely. Staarts size of nickel and spreads.       Uses work boot. Emu oil and vanicrema    No hx of skin cancer    ROS: Patient is generally feeling well today      Physical Examination:  General: Well-appearing appropriately-developed individual.  Skin: Focused examination within the teledermatology photograph(s) including lower legs was performed.   - red scaly macules, left abdomen and scaly hyperpigmented  and erythematous patches, bilaterla medial ankles and calves    Labs:  NA    Past Medical History:   There is no problem list on file for this patient.    Past Medical History:   Diagnosis Date     Achilles bursitis or tendinitis      Closed fracture of middle or proximal phalanx or phalanges of hand      Lipoma of other skin and subcutaneous tissue      Nevus, non-neoplastic      Pure hypercholesterolemia      Viral warts, unspecified      Past Surgical History:   Procedure Laterality Date     C POST CAPSULAR RELEASE,KNEE       DACRYOCYSTORHINOSTOMY Left 3/22/2017    Procedure: DACRYOCYSTORHINOSTOMY;  Surgeon: Maurisio Rueda MD;  Location: [facility] REVISE MEDIAN N/CARPAL TUNNEL SURG       REPAIR ECTROPION Bilateral 12/14/2015    Procedure: REPAIR ECTROPION;  Surgeon: Galilea Skinner MD;  Location:  OR       Social History:  Patient reports that he has never smoked. He does not have any smokeless tobacco history on file. He reports that he does not drink alcohol or use drugs.    Family History:  Family History   Problem Relation Age of Onset     Eye Disorder Mother         glaucoma     Eye Disorder Father         glaucoma       Medications:  Current Outpatient Medications   Medication     cephALEXin (KEFLEX) 500 MG capsule     erythromycin (ROMYCIN) ophthalmic ointment     HYDROcodone-acetaminophen (NORCO) 5-325 MG per tablet     oxyCODONE-acetaminophen (PERCOCET) 5-325 MG per tablet     No current facility-administered medications for this visit.           Allergies   Allergen Reactions     Phenobarbital          _____________________________________________________________________________    Teledermatology information:  - Location of patient: [place]  - Patient presented as: self referral  - Location of teledermatologist:  ([facility] )  - Reason teledermatology is appropriate:  of National Emergency Regarding Coronavirus disease (COVID 19) Outbreak  - Image quality and  "interpretability: acceptable  - Physician has received verbal consent for a Video/Photos Visit from the patient? Yes  - In-person dermatology visit recommendation: yes - for physician visit  - Date of images: 7/8/2020  - Service start time:8:40am  - Service end time:8:57am  - Date of report: 7/9/2020         Teledermatology Nurse Call for NEW Patients (not seen in last 3 years):     The patient was contacted by phone and we reviewed, \"Due to the coronavirus pandemic, we are calling to reschedule your upcoming visit and offer you a teledermatology visit. This would be assessed by an MD or LIANNE;  Importantly, \"a teledermatology visit may not be as thorough as an in-person visit, and the quality of the photograph and/or video sent may not be of the same quality as that taken by the dermatology clinic.\" After discussing the risks, benefits, and alternatives, the patient would like to proceed with teledermatology because of National Emergency Regarding Coronavirus disease (COVID 19) Outbreak.\"    This video visit will be conducted via a call between you and your physician/provider via Sazneo. We have found that certain health care needs can be provided without the need for an in-person physical exam.  This service lets us provide the care you need with a video conversation.  If a prescription is necessary we can send it directly to your pharmacy.  If lab work is needed we can place an order for that and you can then stop by our lab to have the test done at a later time.If during the course of the call the physician/provider feels a video visit is not appropriate, you will not be charged for this service.Visits are billed at different rates depending on your insurance coverage. Please reach out to your insurance provider with any questions.    Patient would like the video invitation sent by:  [employer] waiting room     The patient will also send photographs via Kovio for review.       The patient verified the location of " the photo/video to be [place]. The physician must be notified by nurse if the patient is not in the state of [place] during the encounter     For photos, patient told nursing these are already uploaded     The patient was instructed to take photos of all areas of concern and all areas of any rash.       The patient denied skin pain, fever, mucosal symptoms(lesions, blisters, sores in the mouth, nose, eyes, or genitals)  IF PATIENT ENDORSES ANY OF THESE STOP AND PAGE ON CALL ATTENDING    Additional notes:  Patient summary of issue: Heat rash   Location of problem on body:   Chief Complaint   Patient presents with     Derm Problem     Patient reported: It is a heat rash that starts early spring the size of a nickel and then it spreads slowly. So far it s just on my ankle, calves and around my belly button.     How long has area/symptoms been present: Ongoing during spring and summer time   What makes it better?: Taking hot showers to soothe the itch, instead of itching the areas   What makes it worse?:Humidity   Other symptoms include the following: Itches, but he does not itch it.   Which medications have been tried, for how long, and did they make it better or worse (ex. Triamcinolone, used twice daily for 2 weeks, not improved): He has tried Triamcinolone cream in the past and did not work. He was taking an oral Rx that cleared up areas, but does not recall name of Rx.   The patient has not seen a dermatologist in the past.   The patient hasno past medical history of skin cancer  ROS: The patient is generally feeling well.     Photo instructions reviewed with patients as below:  -ALL patient needs to send photos unless they have a phone only visit approved by the clinic:  o To send photos to your doctor, respond to the message in [employer]Fish as many times as needed to upload your photos. Each message allows for 3 photos.  o For spots or lesions of concern, please take at least 2 photos of each site you are worried about  (at different angles if needed, and at least one close up and one farther away so we can tell where it is on the body. Be sure all photographs are in focus)  o For rashes, take photos of the entire body because it is important for us to see which areas are involved and which areas are not involved.  At a minimum, please include photos of the arms, legs, front of trunk, back of trunk, face, and a few close-ups of the rash.  Leave undergarments in place unless the rash involves the skin in these areas  o For acne, please take photos of the face, upper chest and neck, and upper chest and back  o For hair loss, please send views of the top of the head, sides of the head, back of the head and a picture of your face with your hair pulled back. Also, a photos of both hands with nails.    -For ADULT NEW patients video visits are needed, to receive an invitation and connect with your provider, the [employer] video visit technology must be accessible from your smartphone or personal device. Please click the link below for setup instructions: [employer]/videovisit      Nursing tasks completed  -Pharmacy preference was updated.  -The nurse has dropped in the AVS information *(For adults the phrase is umdermhteleavs and for pediatrics it is their own) for the physician to route in the AVS.                                                                                                                                                                                                                         -The patient was told to contact the clinic if they have not received correspondence within 72 hours.

## 2020-07-09 NOTE — PATIENT INSTRUCTIONS
[facility] Teledermatology Visit    Thank you for allowing us to participate in your care. Your findings, instructions and follow-up plan are as follows:    Come to clinic for fungus scraping  Use Sarna with menthol    When should I call my doctor?    If you are worsening or not improving, please, contact us or seek urgent care as noted below.     Who should I call with questions (adults)?    [facility] (adult and pediatric): [phone]     [facility] (adult): [phone]    For urgent needs outside of business hours call the [facility] at [phone] and ask for the dermatology resident on call    If this is a medical emergency and you are unable to reach an ER, Call [phone]      Who should I call with questions (pediatric)?  [facility]- Pediatric Dermatology  Dr. Alesha rCuz, Dr. Michael Case, Dr. Vale Cruz, Radha Matt, PA  Dr. Yoon Kirkaptrick, Dr. Sona Chris & Dr. Steven Gorman  Non Urgent  Nurse Triage Line; [phone]- Renee and Cristel RN Care Coordinators   Dee (/Complex ) [phone]    If you need a prescription refill, please contact your pharmacy. Refills are approved or denied by our Physicians during normal business hours, Monday through Fridays  Per office policy, refills will not be granted if you have not been seen within the past year (or sooner depending on your child's condition)    Scheduling Information:  Pediatric Appointment Scheduling and Call Center [phone]  Radiology Scheduling- [phone]  Sedation Unit Scheduling- [phone]  [place] Scheduling- General [phone]; Pediatric Dermatology [phone]  Main  Services: [phone]  Belarusian: [phone]  Mosotho: [phone]  Hmong/Armenian/Gabonese: [phone]  Preadmission Nursing Department Fax Number: [phone] (Fax all pre-operative paperwork to  this number)    For urgent matters arising during evenings, weekends, or holidays that cannot wait for normal business hours please call [phone] and ask for the Dermatology Resident On-Call to be paged.

## 2020-07-24 ENCOUNTER — OFFICE VISIT (OUTPATIENT)
Dept: DERMATOLOGY | Facility: CLINIC | Age: 60
End: 2020-07-24
Payer: COMMERCIAL

## 2020-07-24 DIAGNOSIS — L30.9 DERMATITIS: ICD-10-CM

## 2020-07-24 DIAGNOSIS — B35.3 TINEA PEDIS OF LEFT FOOT: ICD-10-CM

## 2020-07-24 DIAGNOSIS — D48.5 NEOPLASM OF UNCERTAIN BEHAVIOR OF SKIN: Primary | ICD-10-CM

## 2020-07-24 PROCEDURE — 88305 TISSUE EXAM BY PATHOLOGIST: CPT | Mod: TC | Performed by: DERMATOLOGY

## 2020-07-24 PROCEDURE — 99213 OFFICE O/P EST LOW 20 MIN: CPT | Mod: 25 | Performed by: DERMATOLOGY

## 2020-07-24 PROCEDURE — 88342 IMHCHEM/IMCYTCHM 1ST ANTB: CPT | Mod: TC | Performed by: DERMATOLOGY

## 2020-07-24 PROCEDURE — 11102 TANGNTL BX SKIN SINGLE LES: CPT | Performed by: DERMATOLOGY

## 2020-07-24 RX ORDER — TRIAMCINOLONE ACETONIDE 0.25 MG/G
OINTMENT TOPICAL
COMMUNITY
Start: 2019-08-19

## 2020-07-24 RX ORDER — KETOCONAZOLE 20 MG/G
CREAM TOPICAL
Qty: 180 G | Refills: 1 | Status: SHIPPED | OUTPATIENT
Start: 2020-07-24 | End: 2020-09-21

## 2020-07-24 ASSESSMENT — PAIN SCALES - GENERAL: PAINLEVEL: NO PAIN (0)

## 2020-07-24 NOTE — LETTER
7/24/2020         RE: Javier Hopson  [address]  [place] [address]        Dear Colleague,    Thank you for referring your patient, Javier Hopson, to the [facility]. Please see a copy of my visit note below.    [facility] Dermatology Note      Dermatology Problem List:  1.Rash, lower legs, and mild on trunk, possibly allergic, gets worse with boots, failed triamcinolone  -koh positive on left foot 7/2020  -sarna with menthol  -vanicream  -ketoconazole added to treated any tinea overlying on 7/2020  -referral to Dr Chapin    Encounter Date: Jul 24, 2020    CC:  Chief Complaint   Patient presents with     Derm Problem     KEVIN scrape         History of Present Illness:  Mr. Javier Hopson is a 59 year old male who repors improvement with cotton sock in his boots. Also new rash on the abdomen at site of belt    Past Medical History:   There is no problem list on file for this patient.    Past Medical History:   Diagnosis Date     Achilles bursitis or tendinitis      Closed fracture of middle or proximal phalanx or phalanges of hand      Lipoma of other skin and subcutaneous tissue      Nevus, non-neoplastic      Pure hypercholesterolemia      Viral warts, unspecified      Past Surgical History:   Procedure Laterality Date     C POST CAPSULAR RELEASE,KNEE       DACRYOCYSTORHINOSTOMY Left 3/22/2017    Procedure: DACRYOCYSTORHINOSTOMY;  Surgeon: Maurisio Rueda MD;  Location: [facility] REVISE MEDIAN N/CARPAL TUNNEL SURG       REPAIR ECTROPION Bilateral 12/14/2015    Procedure: REPAIR ECTROPION;  Surgeon: Galilea Skinner MD;  Location:  OR       Social History:  Patient reports that he has never smoked. He has never used smokeless tobacco. He reports that he does not drink alcohol or use drugs.    Family History:  Family History   Problem Relation Age of Onset     Eye Disorder Mother         glaucoma     Eye Disorder Father         glaucoma        Medications:  Current Outpatient Medications   Medication Sig Dispense Refill     erythromycin (ROMYCIN) ophthalmic ointment Apply small amount to incision sites three times daily and into operative eye(s) at night. (Patient not taking: Reported on 7/9/2020) 3.5 g 0     hydrochlorothiazide (HYDRODIURIL) 25 MG tablet TK 1 T PO D IN THE AM       pravastatin (PRAVACHOL) 40 MG tablet Take 40 mg by mouth         Allergies   Allergen Reactions     Phenobarbital        Review of Systems:  NA    Physical exam:  Vitals: There were no vitals taken for this visit.  GEN: This is a well developed, well-nourished male in no acute distress, in a pleasant mood.    SKIN: Total skin excluding the undergarment areas was performed. The exam included the head/face, neck, both arms, chest, back, abdomen, both legs, digits and/or nails.   -Morse skin type: II, pt is tan  -4mm white papule, right upper back  -pink macules ont eh lower legs and dorsal feet  -No other lesions of concern on areas examined.     danyell pos     Impression and Recommendations (Patient Counseled on the Following):  1.Rash, lower legs and abdomen, possibly contact dermatitis but also has yellow nails on photos, with positive KOH. Work boots and  Emu oil and witch hazel may be contributing  -ketoconazole 2 cream twicedaily for tinea component  -see Dr. Chapin with photo and phone to discus patch testing  -keep using cotton socks with boots  -if rash returns call clinic     2. NUB- right upper back- nevus or scar or bcc or other  Shave biopsy:  After discussion of benefits and risks including but not limited to bleeding/bruising, pain/swelling, infection, scar, incomplete removal, nerve damage/numbness, recurrence, and non-diagnostic biopsy, written consent, verbal consent and photographs were obtained. Time-out was performed. The area was cleaned with isopropyl alcohol. 0.5mL of 1% lidocaine with epinephrine was injected to obtain adequate anesthesia of  the lesion on the right upper back. A shave biopsy was performed. Hemostasis was achieved with aluminium chloride. Vaseline and a sterile dressing were applied. The patient tolerated the procedure and no complications were noted. The patient was provided with verbal and written post care instructions.        Follow-up:   Follow-up with dermatology in approximately 1-2 weeks for koh scraping. Earlier for new or changing lesions or rash.   CC No referring provider defined for this encounter. on close of this encounter.  Follow-up photos and phone with Dr. Chapin for patch test eval and me in 6 months photos and phone      Staff Involved:  Scribe/Staff    Scribe Disclosure:   ISanjuana, am serving as a scribe to document services personally performed by Dr. Vani Spears, based on data collection and the provider's statements to me.   [id number], MEDICAL ASSISTANT                     Provider Disclosure:   The documentation recorded by the scribe accurately reflects the services I personally performed and the decisions made by me.    Vani Spears MD    Department of Dermatology  [facility]: Phone: [phone], Fax:[phone]  [facility]: Phone: [phone], Fax: [phone]           7/27 Called and left voicemail. Provided phone number [phone] to schedule 6 month telephone visit around 1/27/21.     [facility]ca [facility] [place]   Procedure    Ortho/Sports Med/Pod/Ent/Eye/Surgical Specialties  [facility] [place]   [phone]      Again, thank you for allowing me to participate in the care of your patient.        Sincerely,        Vani Spears MD

## 2020-07-24 NOTE — NURSING NOTE
Javier Hopson's goals for this visit include:   Chief Complaint   Patient presents with     Derm Problem     KOH scrape - Rash, lower legs and abdomen, possibly dermatitis. He is currently using Tiamcinolone once daily; voiced improvement.       He requests these members of his care team be copied on today's visit information: Yes     PCP: Home, [facility] Marshal Lecompte    Referring Provider:  No referring provider defined for this encounter.    There were no vitals taken for this visit.    Do you need any medication refills at today's visit? Yes, triamcinolone     LXIONG3, MEDICAL ASSISTANT

## 2020-07-24 NOTE — Clinical Note
Ketan, schedule photo an dphoen with Dr. Chapin for patch test eval    [facility] derm folow up with me in 6 months

## 2020-07-24 NOTE — PATIENT INSTRUCTIONS
-ketoconazole 2 cream twicedaily for tinea component  -see Dr. Chapin with photo and phone to discus patch testing  -keep using cotton socks with boots  -if rash returns call clinic      Wound Care After a Biopsy    What is a skin biopsy?  A skin biopsy allows the doctor to examine a very small piece of tissue under the microscope to determine the diagnosis and the best treatment for the skin condition. A local anesthetic (numbing medicine)  is injected with a very small needle into the skin area to be tested. A small piece of skin is taken from the area. Sometimes a suture (stitch) is used.     What are the risks of a skin biopsy?  I will experience scar, bleeding, swelling, pain, crusting and redness. I may experience incomplete removal or recurrence. Risks of this procedure are excessive bleeding, bruising, infection, nerve damage, numbness, thick (hypertrophic or keloidal) scar and non-diagnostic biopsy.    How should I care for my wound for the first 24 hours?    Keep the wound dry and covered for 24 hours    If it bleeds, hold direct pressure on the area for 15 minutes. If bleeding does not stop then go to the emergency room    Avoid strenuous exercise the first 1-2 days or as your doctor instructs you    How should I care for the wound after 24 hours?    After 24 hours, remove the bandage    You may bathe or shower as normal    If you had a scalp biopsy, you can shampoo as usual and can use shower water to clean the biopsy site daily    Clean the wound twice a day with gentle soap and water    Do not scrub, be gentle    Apply white petroleum/Vaseline after cleaning the wound with a cotton swab or a clean finger, and keep the site covered with a Bandaid /bandage. Bandages are not necessary with a scalp biopsy    If you are unable to cover the site with a Bandaid /bandage, re-apply ointment 2-3 times a day to keep the site moist. Moisture will help with healing    Avoid strenuous activity for first 1-2  days    Avoid lakes, rivers, pools, and oceans until the stitches are removed or the site is healed    How do I clean my wound?    Wash hands thoroughly with soap or use hand  before all wound care    Clean the wound with gentle soap and water    Apply white petroleum/Vaseline  to wound after it is clean    Replace the Bandaid /bandage to keep the wound covered for the first few days or as instructed by your doctor    If you had a scalp biopsy, warm shower water to the area on a daily basis should suffice    What should I use to clean my wound?     Cotton-tipped applicators (Qtips )    White petroleum jelly (Vaseline ). Use a clean new container and use Q-tips to apply.    Bandaids   as needed    Gentle soap     How should I care for my wound long term?    Do not get your wound dirty    Keep up with wound care for one week or until the area is healed.    A small scab will form and fall off by itself when the area is completely healed. The area will be red and will become pink in color as it heals. Sun protection is very important for how your scar will turn out. Sunscreen with an SPF 30 or greater is recommended once the area is healed.        You should have some soreness but it should be mild and slowly go away over several days. Talk to your doctor about using tylenol for pain,    When should I call my doctor?  If you have increased:     Pain or swelling    Pus or drainage (clear or slightly yellow drainage is ok)    Temperature over 100F    Spreading redness or warmth around wound    When will I hear about my results?  The biopsy results can take 2-3 weeks to come back. The clinic will call you with the results, send you a Snapchat message, or have you schedule a follow-up clinic or phone time to discuss the results. Contact our clinics if you do not hear from us in 3 weeks.     Who should I call with questions?    [facility]: [phone]     [facility]  Critical access hospital: [phone]    For urgent needs outside of business hours call the [facility] at [phone] and ask for the dermatology resident on call

## 2020-07-27 NOTE — PROGRESS NOTES
7/27 Called and left voicemail. Provided phone number [phone] to schedule 6 month telephone visit around 1/27/21.     Bianca Alatorre   Procedure    Ortho/Sports Med/Pod/Ent/Eye/Surgical Specialties  [facility]   [phone]

## 2020-07-28 LAB — COPATH REPORT: NORMAL

## 2020-07-29 ENCOUNTER — TELEPHONE (OUTPATIENT)
Dept: ALLERGY | Facility: CLINIC | Age: 60
End: 2020-07-29

## 2020-09-17 ENCOUNTER — TELEPHONE (OUTPATIENT)
Dept: DERMATOLOGY | Facility: CLINIC | Age: 60
End: 2020-09-17

## 2020-09-17 DIAGNOSIS — D48.5 NEOPLASM OF UNCERTAIN BEHAVIOR OF SKIN: ICD-10-CM

## 2020-09-21 RX ORDER — KETOCONAZOLE 20 MG/G
CREAM TOPICAL
Qty: 60 G | Refills: 0 | Status: SHIPPED | OUTPATIENT
Start: 2020-09-21

## 2020-09-21 NOTE — TELEPHONE ENCOUNTER
KETOCONAZOLE 2% CREA     Requested/current  Rx:     APPLY TO AFFECTED AREA(S) OF THE FEET AND LOWER LEGS TWO TIMES A DAY FOR 6 WEEEKS   Last Written Prescription Date:  7-24-20  Last Fill Quantity: 180 g,   # refills: 1  Last Office Visit : 7-24-20  (RTC 1-2 W)  Future Office visit:  none      Last clinic note:  1.Rash, lower legs and abdomen, possibly contact dermatitis but also has yellow nails on photos, with positive KOH. Work boots and  Emu oil and witch hazel may be contributing  -ketoconazole 2 cream twicedaily for tinea component  -see Dr. Chapin with photo and phone to discus patch testing  -keep using cotton socks with boots  -if rash returns call clinic    Follow-up:   Follow-up with dermatology in approximately 1-2 weeks for koh scraping.      Routing refill request to provider for review/approval because:  Requested directions does not match clinic note,   ? 6 weeks    Scheduling has been notified to contact the pt for appointment.

## 2020-09-21 NOTE — TELEPHONE ENCOUNTER
Will approve refill for ketoconazole 2% cream for tinea pedis x 1 tube. Pt was seen in July 2020 by Dr. Spears for dermatitis vs tinea infection of the feet. He was supposed to come back in 2 weeks for KOH scraping, but appointment was not made/kept. Pt declined allergy patch testing referral. Unless rash has worsened during that time, it is OK to continue empiric treatment for tinea pedis with ketoconazole cream. Pt should have a follow up visit scheduled, which appears to be in process.    FYI to Dr. Regan Muhammad MD  PGY-4 Medicine-Dermatology  Pager [phone]

## 2020-09-21 NOTE — TELEPHONE ENCOUNTER
9/21 Called and left voicemail. Provided phone number [phone] to schedule follow up with Dr. Spears.     Bianca Alatorre   Procedure    Ortho/Sports Med/Pod/Ent/Eye/Surgical Specialties  [facility]   [phone]

## 2020-09-28 NOTE — TELEPHONE ENCOUNTER
9/28 2nd attempt.  Called and left voicemail. Provided phone number [phone] to schedule follow up with Dr. Spears.      Bianca Alatorre          Procedure    Ortho/Sports Med/Pod/Ent/Eye/Surgical Specialties  [facility]   [phone]

## 2020-10-05 NOTE — TELEPHONE ENCOUNTER
10/5 3rd attempt.  Called and left voicemail. Provided phone number [phone] to schedule follow up with Dr. Spears.      Bianca Alatorre          Procedure    Ortho/Sports Med/Pod/Ent/Eye/Surgical Specialties  [facility]   [phone]

## 2020-10-27 NOTE — TELEPHONE ENCOUNTER
The patient called and stated he has moved to [place] and is no longer needing to see Dr. Spears.  Thank you.

## 2020-10-27 NOTE — PROGRESS NOTES
8/27 2nd attempt.  Called and left voicemail. Provided phone number [phone] to schedule 6 month telephone visit around 1/27/21.      Bianca Alatorre          Procedure    Ortho/Sports Med/Pod/Ent/Eye/Surgical Specialties  [facility] [place]   [phone]

## 2021-01-15 ENCOUNTER — HEALTH MAINTENANCE LETTER (OUTPATIENT)
Age: 61
End: 2021-01-15

## 2021-09-05 ENCOUNTER — HEALTH MAINTENANCE LETTER (OUTPATIENT)
Age: 61
End: 2021-09-05

## 2022-02-20 ENCOUNTER — HEALTH MAINTENANCE LETTER (OUTPATIENT)
Age: 62
End: 2022-02-20

## 2022-10-23 ENCOUNTER — HEALTH MAINTENANCE LETTER (OUTPATIENT)
Age: 62
End: 2022-10-23

## 2023-04-02 ENCOUNTER — HEALTH MAINTENANCE LETTER (OUTPATIENT)
Age: 63
End: 2023-04-02

## (undated) DEVICE — LINEN TOWEL PACK X5 5464

## (undated) DEVICE — ESU ELEC NDL 1" E1552

## (undated) DEVICE — Device

## (undated) DEVICE — PACK OCULOPLATIC SEN15OCFSD

## (undated) DEVICE — GLOVE PROTEXIS MICRO 7.0  2D73PM70

## (undated) DEVICE — SU CHROMIC 4-0 G-2DA 18" 798G

## (undated) DEVICE — SUCTION CANISTER MEDIVAC LINER 1500ML W/LID 65651-515

## (undated) DEVICE — SU PLAIN FAST ABSORB 6-0 PC-1 18" 1916G

## (undated) DEVICE — TUBING SUCTION 12"X1/4" N612

## (undated) DEVICE — SU VICRYL 5-0 P-1 18" UND J490G

## (undated) DEVICE — SPONGE COTTONOID 1/2X3" 80-1407

## (undated) DEVICE — SOL NACL 0.9% IRRIG 1000ML BOTTLE 07138-09

## (undated) DEVICE — GLOVE PROTEXIS MICRO 8.0  2D73PM80

## (undated) DEVICE — SU SILK 4-0 G-3DA 18" 783G

## (undated) DEVICE — DRSG GAUZE 4X4" 3033

## (undated) RX ORDER — PROPOFOL 10 MG/ML
INJECTION, EMULSION INTRAVENOUS
Status: DISPENSED
Start: 2017-03-22

## (undated) RX ORDER — DEXAMETHASONE SODIUM PHOSPHATE 4 MG/ML
INJECTION, SOLUTION INTRA-ARTICULAR; INTRALESIONAL; INTRAMUSCULAR; INTRAVENOUS; SOFT TISSUE
Status: DISPENSED
Start: 2017-03-22

## (undated) RX ORDER — LIDOCAINE HYDROCHLORIDE AND EPINEPHRINE 10; 10 MG/ML; UG/ML
INJECTION, SOLUTION INFILTRATION; PERINEURAL
Status: DISPENSED
Start: 2017-03-22

## (undated) RX ORDER — CEFAZOLIN SODIUM 1 G/3ML
INJECTION, POWDER, FOR SOLUTION INTRAMUSCULAR; INTRAVENOUS
Status: DISPENSED
Start: 2017-03-22

## (undated) RX ORDER — LIDOCAINE HYDROCHLORIDE 20 MG/ML
INJECTION, SOLUTION EPIDURAL; INFILTRATION; INTRACAUDAL; PERINEURAL
Status: DISPENSED
Start: 2017-03-22

## (undated) RX ORDER — GLYCOPYRROLATE 0.2 MG/ML
INJECTION, SOLUTION INTRAMUSCULAR; INTRAVENOUS
Status: DISPENSED
Start: 2017-03-22

## (undated) RX ORDER — NEOMYCIN SULFATE, POLYMYXIN B SULFATE, AND DEXAMETHASONE 3.5; 10000; 1 MG/G; [USP'U]/G; MG/G
OINTMENT OPHTHALMIC
Status: DISPENSED
Start: 2017-03-22

## (undated) RX ORDER — FENTANYL CITRATE 50 UG/ML
INJECTION, SOLUTION INTRAMUSCULAR; INTRAVENOUS
Status: DISPENSED
Start: 2017-03-22

## (undated) RX ORDER — ONDANSETRON 2 MG/ML
INJECTION INTRAMUSCULAR; INTRAVENOUS
Status: DISPENSED
Start: 2017-03-22

## (undated) RX ORDER — OXYMETAZOLINE HYDROCHLORIDE 0.05 G/100ML
SPRAY NASAL
Status: DISPENSED
Start: 2017-03-22